# Patient Record
Sex: MALE | Race: WHITE | NOT HISPANIC OR LATINO | Employment: OTHER | URBAN - METROPOLITAN AREA
[De-identification: names, ages, dates, MRNs, and addresses within clinical notes are randomized per-mention and may not be internally consistent; named-entity substitution may affect disease eponyms.]

---

## 2017-10-12 DIAGNOSIS — Z20.828 CONTACT WITH AND (SUSPECTED) EXPOSURE TO OTHER VIRAL COMMUNICABLE DISEASES: ICD-10-CM

## 2017-10-19 ENCOUNTER — LAB CONVERSION - ENCOUNTER (OUTPATIENT)
Dept: OTHER | Facility: OTHER | Age: 32
End: 2017-10-19

## 2017-10-19 LAB
ADDITIONAL INFORMATION (HISTORICAL): NO
ADDITIONAL INFORMATION (HISTORICAL): NO
Lab: NEGATIVE
Lab: NO
Lab: NORMAL
Lab: NOT DETECTED
Lab: YES
Lab: YES

## 2017-12-08 ENCOUNTER — ALLSCRIPTS OFFICE VISIT (OUTPATIENT)
Dept: OTHER | Facility: OTHER | Age: 32
End: 2017-12-08

## 2017-12-08 DIAGNOSIS — D56.9 THALASSEMIA: ICD-10-CM

## 2017-12-18 ENCOUNTER — LAB CONVERSION - ENCOUNTER (OUTPATIENT)
Dept: OTHER | Facility: OTHER | Age: 32
End: 2017-12-18

## 2017-12-18 LAB
BASOPHILS # BLD AUTO: 0.4 %
BASOPHILS # BLD AUTO: 20 CELLS/UL (ref 0–200)
DEPRECATED RDW RBC AUTO: 11.9 % (ref 11–15)
DEPRECATED RDW RBC AUTO: 11.9 % (ref 11–15)
EOSINOPHIL # BLD AUTO: 1 %
EOSINOPHIL # BLD AUTO: 51 CELLS/UL (ref 15–500)
HCT VFR BLD AUTO: 41.1 % (ref 38.5–50)
HCT VFR BLD AUTO: 41.1 % (ref 38.5–50)
HEMOGLOBIN A2 QUANTITATION (HISTORICAL): 2.2 % (ref 1.8–3.5)
HEMOGLOBIN A2 QUANTITATION (HISTORICAL): 96.8 %
HEMOGLOBIN F QUANTITATION (HISTORICAL): <1 %
HGB BLD-MCNC: 13.9 G/DL (ref 13.2–17.1)
HGB BLD-MCNC: 13.9 G/DL (ref 13.2–17.1)
INTERPRETATION (HISTORICAL): NORMAL
LYMPHOCYTES # BLD AUTO: 1882 CELLS/UL (ref 850–3900)
LYMPHOCYTES # BLD AUTO: 36.9 %
MCH RBC QN AUTO: 31.7 PG (ref 27–33)
MCH RBC QN AUTO: 31.7 PG (ref 27–33)
MCHC RBC AUTO-ENTMCNC: 33.8 G/DL (ref 32–36)
MCV RBC AUTO: 93.6 FL (ref 80–100)
MCV RBC AUTO: 93.6 FL (ref 80–100)
MONOCYTES # BLD AUTO: 464 CELLS/UL (ref 200–950)
MONOCYTES (HISTORICAL): 9.1 %
NEUTROPHILS # BLD AUTO: 2683 CELLS/UL (ref 1500–7800)
NEUTROPHILS # BLD AUTO: 52.6 %
PLATELET # BLD AUTO: 185 THOUSAND/UL (ref 140–400)
PMV BLD AUTO: 12 FL (ref 7.5–12.5)
RBC # BLD AUTO: 4.39 MILLION/UL (ref 4.2–5.8)
RBC # BLD AUTO: 4.39 MILLION/UL (ref 4.2–5.8)
WBC # BLD AUTO: 5.1 THOUSAND/UL (ref 3.8–10.8)

## 2017-12-29 ENCOUNTER — GENERIC CONVERSION - ENCOUNTER (OUTPATIENT)
Dept: OTHER | Facility: OTHER | Age: 32
End: 2017-12-29

## 2018-01-23 VITALS
DIASTOLIC BLOOD PRESSURE: 68 MMHG | OXYGEN SATURATION: 98 % | TEMPERATURE: 97.7 F | HEART RATE: 68 BPM | HEIGHT: 68 IN | SYSTOLIC BLOOD PRESSURE: 122 MMHG | WEIGHT: 177 LBS | BODY MASS INDEX: 26.83 KG/M2
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
## 2018-12-12 ENCOUNTER — OFFICE VISIT (OUTPATIENT)
Dept: FAMILY MEDICINE CLINIC | Facility: CLINIC | Age: 33
End: 2018-12-12
Payer: COMMERCIAL

## 2018-12-12 VITALS
HEART RATE: 87 BPM | TEMPERATURE: 98.3 F | WEIGHT: 173.2 LBS | HEIGHT: 68 IN | BODY MASS INDEX: 26.25 KG/M2 | OXYGEN SATURATION: 98 % | SYSTOLIC BLOOD PRESSURE: 124 MMHG | DIASTOLIC BLOOD PRESSURE: 62 MMHG

## 2018-12-12 DIAGNOSIS — M99.08 SEGMENTAL AND SOMATIC DYSFUNCTION OF RIB CAGE: ICD-10-CM

## 2018-12-12 DIAGNOSIS — S29.012A STRAIN OF MUSCLE AND TENDON OF BACK WALL OF THORAX, INITIAL ENCOUNTER: ICD-10-CM

## 2018-12-12 DIAGNOSIS — M99.01 CERVICAL SOMATIC DYSFUNCTION: ICD-10-CM

## 2018-12-12 DIAGNOSIS — M99.04 SACRAL REGION SOMATIC DYSFUNCTION: ICD-10-CM

## 2018-12-12 DIAGNOSIS — G89.29 CHRONIC RIGHT-SIDED LOW BACK PAIN WITHOUT SCIATICA: ICD-10-CM

## 2018-12-12 DIAGNOSIS — M99.05 PELVIC SOMATIC DYSFUNCTION: ICD-10-CM

## 2018-12-12 DIAGNOSIS — M54.2 NECK PAIN ON RIGHT SIDE: ICD-10-CM

## 2018-12-12 DIAGNOSIS — S16.1XXA CERVICAL STRAIN, INITIAL ENCOUNTER: ICD-10-CM

## 2018-12-12 DIAGNOSIS — R07.81 RIB PAIN ON LEFT SIDE: ICD-10-CM

## 2018-12-12 DIAGNOSIS — M99.02 SOMATIC DYSFUNCTION OF THORACIC REGION: ICD-10-CM

## 2018-12-12 DIAGNOSIS — S23.41XA RIB SPRAIN, INITIAL ENCOUNTER: ICD-10-CM

## 2018-12-12 DIAGNOSIS — M54.50 CHRONIC RIGHT-SIDED LOW BACK PAIN WITHOUT SCIATICA: ICD-10-CM

## 2018-12-12 DIAGNOSIS — Z23 NEED FOR INFLUENZA VACCINATION: Primary | ICD-10-CM

## 2018-12-12 DIAGNOSIS — S39.013A STRAIN OF MUSCLE, FASCIA AND TENDON OF PELVIS, INITIAL ENCOUNTER: ICD-10-CM

## 2018-12-12 DIAGNOSIS — S39.012A STRAIN, SACRAL, INITIAL ENCOUNTER: ICD-10-CM

## 2018-12-12 PROCEDURE — 98927 OSTEOPATH MANJ 5-6 REGIONS: CPT | Performed by: FAMILY MEDICINE

## 2018-12-12 PROCEDURE — 90686 IIV4 VACC NO PRSV 0.5 ML IM: CPT

## 2018-12-12 PROCEDURE — 90471 IMMUNIZATION ADMIN: CPT

## 2018-12-12 PROCEDURE — 99214 OFFICE O/P EST MOD 30 MIN: CPT | Performed by: FAMILY MEDICINE
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
## 2018-12-13 NOTE — PROGRESS NOTES
OMT  Performed by: Faisal Brown  Authorized by: Faisal Brown     Verbal consent obtained?: Yes    Risks and benefits: Risks, benefits and alternatives were discussed    Consent given by:  Patient  Patient states understanding of procedure being performed: Yes    Patient's understanding of procedure matches consent: Yes    Procedure Details:     Region evaluated and treated:  Cervical, Thoracic T1 - T4, Ribs, Sacrum/Pelvis and Pelvis Innominate    Cervical Details:     Examination Method:  Tenderness, Pain and Asymmetry, Misalignment, Crepitation, Defects, Masses    Severity:  Mild    Treatment Method:  High Velocity, Low Amplitude Treatment    Response:  Resolved - The somatic dysfunction is completely resolved without evidence of it ever having been present  Thoracic T1 - T4 details:     Examination Method:  Asymmetry, Misalignment, Crepitation, Defects, Masses and Tenderness, Pain    Treatment Method:  High Velocity, Low Amplitude Treatment    Response:  Resolved    Sacrum/Pelvis details:     Examination Method:  Asymmetry, Misalignment, Crepitation, Defects, Masses and Tenderness, Pain    Severity:  Mild    Treatment Method:  High Velocity, Low Amplitude Treatment    Response:  Resolved - The somatic dysfunction is completely resolved without evidence of it ever having been present  Pelvis Innominate details:     Examination Method:  Asymmetry, Misalignment, Crepitation, Defects, Masses    Treatment Method:  High Velocity, Low Amplitude Treatment    Response:  Resolved - The somatic dysfunction is completely resolved without evidence of it ever having been present  Ribs details:     Examination Method:  Tenderness, Pain and Asymmetry, Misalignment, Crepitation, Defects, Masses    Severity:  Mild    Treatment Method:  High Velocity, Low Amplitude Treatment    Response:  Resolved - The somatic dysfunction is completely resolved without evidence of it ever having been present      Total Regions Treated: 5

## 2018-12-13 NOTE — PATIENT INSTRUCTIONS
Unfold above  Recheck, level upp thorax and pelvis  Problems should be gone  Any problem call  No heat to areas

## 2020-10-27 ENCOUNTER — OFFICE VISIT (OUTPATIENT)
Dept: FAMILY MEDICINE CLINIC | Facility: CLINIC | Age: 35
End: 2020-10-27
Payer: COMMERCIAL

## 2020-10-27 VITALS
BODY MASS INDEX: 25.1 KG/M2 | WEIGHT: 165.6 LBS | DIASTOLIC BLOOD PRESSURE: 62 MMHG | OXYGEN SATURATION: 97 % | HEIGHT: 68 IN | TEMPERATURE: 96.5 F | HEART RATE: 77 BPM | SYSTOLIC BLOOD PRESSURE: 108 MMHG

## 2020-10-27 DIAGNOSIS — M99.04 SACRAL REGION SOMATIC DYSFUNCTION: ICD-10-CM

## 2020-10-27 DIAGNOSIS — M99.05 PELVIC SOMATIC DYSFUNCTION: ICD-10-CM

## 2020-10-27 DIAGNOSIS — S39.012A STRAIN, SACRAL, INITIAL ENCOUNTER: ICD-10-CM

## 2020-10-27 DIAGNOSIS — L71.9 ROSACEA: Primary | ICD-10-CM

## 2020-10-27 DIAGNOSIS — G89.29 CHRONIC BILATERAL LOW BACK PAIN WITHOUT SCIATICA: ICD-10-CM

## 2020-10-27 DIAGNOSIS — M54.50 CHRONIC BILATERAL LOW BACK PAIN WITHOUT SCIATICA: ICD-10-CM

## 2020-10-27 DIAGNOSIS — S39.012A LUMBAR STRAIN, INITIAL ENCOUNTER: ICD-10-CM

## 2020-10-27 DIAGNOSIS — M99.03 SEGMENTAL AND SOMATIC DYSFUNCTION OF LUMBAR REGION: ICD-10-CM

## 2020-10-27 DIAGNOSIS — S39.013A STRAIN OF MUSCLE, FASCIA AND TENDON OF PELVIS, INITIAL ENCOUNTER: ICD-10-CM

## 2020-10-27 DIAGNOSIS — L85.3 DRY SKIN DERMATITIS: ICD-10-CM

## 2020-10-27 PROCEDURE — 99214 OFFICE O/P EST MOD 30 MIN: CPT | Performed by: FAMILY MEDICINE

## 2020-10-27 PROCEDURE — 98926 OSTEOPATH MANJ 3-4 REGIONS: CPT | Performed by: FAMILY MEDICINE

## 2020-10-27 PROCEDURE — 3008F BODY MASS INDEX DOCD: CPT | Performed by: FAMILY MEDICINE

## 2021-05-17 ENCOUNTER — OFFICE VISIT (OUTPATIENT)
Dept: FAMILY MEDICINE CLINIC | Facility: CLINIC | Age: 36
End: 2021-05-17
Payer: COMMERCIAL

## 2021-05-17 VITALS
TEMPERATURE: 97.2 F | HEIGHT: 68 IN | SYSTOLIC BLOOD PRESSURE: 118 MMHG | RESPIRATION RATE: 19 BRPM | HEART RATE: 76 BPM | WEIGHT: 165.6 LBS | BODY MASS INDEX: 25.1 KG/M2 | DIASTOLIC BLOOD PRESSURE: 80 MMHG | OXYGEN SATURATION: 99 %

## 2021-05-17 DIAGNOSIS — M99.01 CERVICAL SOMATIC DYSFUNCTION: ICD-10-CM

## 2021-05-17 DIAGNOSIS — M54.9 ACUTE UPPER BACK PAIN: ICD-10-CM

## 2021-05-17 DIAGNOSIS — M99.08 SEGMENTAL AND SOMATIC DYSFUNCTION OF RIB CAGE: ICD-10-CM

## 2021-05-17 DIAGNOSIS — S23.41XA RIB SPRAIN, INITIAL ENCOUNTER: ICD-10-CM

## 2021-05-17 DIAGNOSIS — M99.02 SOMATIC DYSFUNCTION OF THORACIC REGION: ICD-10-CM

## 2021-05-17 DIAGNOSIS — S16.1XXA CERVICAL STRAIN, INITIAL ENCOUNTER: ICD-10-CM

## 2021-05-17 DIAGNOSIS — S29.012A STRAIN OF MUSCLE AND TENDON OF BACK WALL OF THORAX, INITIAL ENCOUNTER: ICD-10-CM

## 2021-05-17 DIAGNOSIS — M54.2 NECK PAIN: Primary | ICD-10-CM

## 2021-05-17 PROCEDURE — 3725F SCREEN DEPRESSION PERFORMED: CPT | Performed by: FAMILY MEDICINE

## 2021-05-17 PROCEDURE — 98926 OSTEOPATH MANJ 3-4 REGIONS: CPT | Performed by: FAMILY MEDICINE

## 2021-05-17 PROCEDURE — 3008F BODY MASS INDEX DOCD: CPT | Performed by: FAMILY MEDICINE

## 2021-05-17 PROCEDURE — 99214 OFFICE O/P EST MOD 30 MIN: CPT | Performed by: FAMILY MEDICINE

## 2021-05-17 PROCEDURE — 1036F TOBACCO NON-USER: CPT | Performed by: FAMILY MEDICINE

## 2021-05-17 NOTE — PROGRESS NOTES
OMT  Performed by: Harjinder Mobley DO  Authorized by: Harjinder Mobley, DO     Verbal consent obtained?: Yes    Risks and benefits: Risks, benefits and alternatives were discussed    Consent given by:  Patient  Procedure Details:     Region evaluated and treated:  Cervical, Thoracic T1 - T4 and Ribs    Cervical Details:     Examination Method:  Asymmetry, Misalignment, Crepitation, Defects, Masses, Tenderness, Pain and Range of Motion, Contracture    Severity:  Mild    Treatment Method:  High Velocity, Low Amplitude Treatment, Soft Tissue Treatment, Muscle Energy Treatment and Myofascial Release Treatment    Response:  Improved - The somatic dysfunction is improved but not completely resolved  Thoracic T1 - T4 details:     Examination Method:  Asymmetry, Misalignment, Crepitation, Defects, Masses and Tenderness, Pain    Severity:  Mild    Treatment Method:  High Velocity, Low Amplitude Treatment, Muscle Energy Treatment, Myofascial Release Treatment and Soft Tissue Treatment    Response:  Improved    Ribs details:     Examination Method:  Asymmetry, Misalignment, Crepitation, Defects, Masses and Tenderness, Pain    Severity:  Mild    Treatment Method:  High Velocity, Low Amplitude Treatment, Muscle Energy Treatment and Soft Tissue Treatment    Response:  Resolved - The somatic dysfunction is completely resolved without evidence of it ever having been present      Total Regions Treated:  3
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
## 2021-10-15 ENCOUNTER — OFFICE VISIT (OUTPATIENT)
Dept: FAMILY MEDICINE CLINIC | Facility: CLINIC | Age: 36
End: 2021-10-15
Payer: COMMERCIAL

## 2021-10-15 VITALS
TEMPERATURE: 97.1 F | HEIGHT: 68 IN | WEIGHT: 160.4 LBS | HEART RATE: 84 BPM | DIASTOLIC BLOOD PRESSURE: 68 MMHG | BODY MASS INDEX: 24.31 KG/M2 | OXYGEN SATURATION: 99 % | SYSTOLIC BLOOD PRESSURE: 112 MMHG

## 2021-10-15 DIAGNOSIS — M99.04 SACRAL REGION SOMATIC DYSFUNCTION: ICD-10-CM

## 2021-10-15 DIAGNOSIS — S39.013A STRAIN OF MUSCLE, FASCIA AND TENDON OF PELVIS, INITIAL ENCOUNTER: ICD-10-CM

## 2021-10-15 DIAGNOSIS — M99.03 SEGMENTAL AND SOMATIC DYSFUNCTION OF LUMBAR REGION: ICD-10-CM

## 2021-10-15 DIAGNOSIS — S39.012A STRAIN, SACRAL, INITIAL ENCOUNTER: ICD-10-CM

## 2021-10-15 DIAGNOSIS — M54.50 CHRONIC BILATERAL LOW BACK PAIN WITHOUT SCIATICA: Primary | ICD-10-CM

## 2021-10-15 DIAGNOSIS — M21.70 UNEQUAL LEG LENGTH: ICD-10-CM

## 2021-10-15 DIAGNOSIS — M21.072 ACQUIRED EVERSION OF LEFT FOOT: ICD-10-CM

## 2021-10-15 DIAGNOSIS — G89.29 CHRONIC BILATERAL LOW BACK PAIN WITHOUT SCIATICA: Primary | ICD-10-CM

## 2021-10-15 DIAGNOSIS — M99.05 PELVIC SOMATIC DYSFUNCTION: ICD-10-CM

## 2021-10-15 DIAGNOSIS — S39.012A LUMBAR STRAIN, INITIAL ENCOUNTER: ICD-10-CM

## 2021-10-15 PROCEDURE — 99214 OFFICE O/P EST MOD 30 MIN: CPT | Performed by: FAMILY MEDICINE

## 2021-10-15 PROCEDURE — 3008F BODY MASS INDEX DOCD: CPT | Performed by: FAMILY MEDICINE

## 2021-10-15 PROCEDURE — 98926 OSTEOPATH MANJ 3-4 REGIONS: CPT | Performed by: FAMILY MEDICINE

## 2021-10-15 PROCEDURE — 1036F TOBACCO NON-USER: CPT | Performed by: FAMILY MEDICINE

## 2021-10-15 PROCEDURE — 3725F SCREEN DEPRESSION PERFORMED: CPT | Performed by: FAMILY MEDICINE

## 2021-10-22 ENCOUNTER — HOSPITAL ENCOUNTER (OUTPATIENT)
Dept: RADIOLOGY | Age: 36
Discharge: HOME | End: 2021-10-22
Payer: COMMERCIAL

## 2021-10-22 DIAGNOSIS — G89.29 CHRONIC BILATERAL LOW BACK PAIN WITHOUT SCIATICA: ICD-10-CM

## 2021-10-22 DIAGNOSIS — M54.50 CHRONIC BILATERAL LOW BACK PAIN WITHOUT SCIATICA: ICD-10-CM

## 2021-10-22 DIAGNOSIS — M21.70 UNEQUAL LEG LENGTH: ICD-10-CM

## 2021-10-22 PROCEDURE — 77073 BONE LENGTH STUDIES: CPT

## 2022-05-16 ENCOUNTER — OFFICE VISIT (OUTPATIENT)
Dept: FAMILY MEDICINE CLINIC | Facility: CLINIC | Age: 37
End: 2022-05-16
Payer: COMMERCIAL

## 2022-05-16 VITALS
HEIGHT: 68 IN | TEMPERATURE: 97.6 F | SYSTOLIC BLOOD PRESSURE: 118 MMHG | DIASTOLIC BLOOD PRESSURE: 80 MMHG | HEART RATE: 82 BPM | OXYGEN SATURATION: 98 % | BODY MASS INDEX: 24.71 KG/M2 | WEIGHT: 163 LBS

## 2022-05-16 DIAGNOSIS — N50.811 PAIN IN RIGHT TESTICLE: ICD-10-CM

## 2022-05-16 DIAGNOSIS — Z00.00 HEALTH CARE MAINTENANCE: Primary | ICD-10-CM

## 2022-05-16 PROCEDURE — 3008F BODY MASS INDEX DOCD: CPT | Performed by: FAMILY MEDICINE

## 2022-05-16 PROCEDURE — 99395 PREV VISIT EST AGE 18-39: CPT | Performed by: FAMILY MEDICINE

## 2022-05-16 PROCEDURE — 1036F TOBACCO NON-USER: CPT | Performed by: FAMILY MEDICINE

## 2022-05-16 PROCEDURE — 3725F SCREEN DEPRESSION PERFORMED: CPT | Performed by: FAMILY MEDICINE

## 2022-05-16 RX ORDER — DOXYCYCLINE 100 MG/1
100 TABLET ORAL 2 TIMES DAILY
COMMUNITY
Start: 2022-05-09 | End: 2022-05-19

## 2022-05-16 RX ORDER — DOXYCYCLINE 100 MG/1
100 TABLET ORAL 2 TIMES DAILY
COMMUNITY
Start: 2022-05-09

## 2022-05-16 NOTE — PROGRESS NOTES
Chief Complaint   Patient presents with    Testicle Pain     Assessment/Plan:  Discussed protein intake  BMI Counseling: Body mass index is 24 78 kg/m²  The BMI in normal range  PHQ-2/9 Depression Screening    Little interest or pleasure in doing things: 0 - not at all  Feeling down, depressed, or hopeless: 0 - not at all  PHQ-2 Score: 0  PHQ-2 Interpretation: Negative depression screen          Diagnoses and all orders for this visit:    Health care maintenance    Pain in right testicle    Other orders  -     doxycycline (ADOXA) 100 MG tablet; Take 100 mg by mouth in the morning and 100 mg before bedtime   -     doxycycline (ADOXA) 100 MG tablet; Take 100 mg by mouth in the morning and 100 mg in the evening  Subjective:      Patient ID: Cynthia Handy is a 29 y o  male  Physical   Right low back and right testicle pain  Saw Urology - treated with Doxycycline - testicle pain gone  FH: mother healthy, Father: Alcoholic, Type 2 DM, poor dentition  SH: neg tob, occasional OH  The following portions of the patient's history were reviewed and updated as appropriate: allergies, current medications, past family history, past medical history, past social history, past surgical history and problem list     Review of Systems   Constitutional: Negative  HENT: Negative  Eyes: Negative  Respiratory: Negative  Cardiovascular: Negative  Gastrointestinal: Negative  Genitourinary: Negative  Musculoskeletal: Negative  Skin: Negative  Neurological: Negative  Psychiatric/Behavioral: Negative  Objective:      /80 (BP Location: Left arm, Patient Position: Sitting, Cuff Size: Large)   Pulse 82   Temp 97 6 °F (36 4 °C) (Temporal)   Ht 5' 8" (1 727 m)   Wt 73 9 kg (163 lb)   SpO2 98%   BMI 24 78 kg/m²       Current Outpatient Medications:     doxycycline (ADOXA) 100 MG tablet, Take 100 mg by mouth in the morning and 100 mg before bedtime  , Disp: , Rfl:    doxycycline (ADOXA) 100 MG tablet, Take 100 mg by mouth in the morning and 100 mg in the evening , Disp: , Rfl:   No Known Allergies  History reviewed  No pertinent surgical history  Physical Exam  Constitutional:       Appearance: He is well-developed  HENT:      Head: Normocephalic and atraumatic  Right Ear: Tympanic membrane, ear canal and external ear normal       Left Ear: Tympanic membrane, ear canal and external ear normal       Nose: Nose normal       Mouth/Throat:      Mouth: Mucous membranes are moist    Eyes:      Conjunctiva/sclera: Conjunctivae normal       Pupils: Pupils are equal, round, and reactive to light  Cardiovascular:      Rate and Rhythm: Normal rate and regular rhythm  Heart sounds: Normal heart sounds  Pulmonary:      Effort: Pulmonary effort is normal       Breath sounds: Normal breath sounds  Abdominal:      General: Abdomen is flat  Bowel sounds are normal       Palpations: Abdomen is soft  Musculoskeletal:      Cervical back: Normal range of motion and neck supple  Skin:     General: Skin is warm and dry  Neurological:      General: No focal deficit present  Mental Status: He is alert and oriented to person, place, and time  Deep Tendon Reflexes: Reflexes are normal and symmetric  Psychiatric:         Mood and Affect: Mood normal          Behavior: Behavior normal          Thought Content:  Thought content normal          Judgment: Judgment normal

## 2023-07-11 ENCOUNTER — TELEPHONE (OUTPATIENT)
Dept: FAMILY MEDICINE CLINIC | Facility: CLINIC | Age: 38
End: 2023-07-11

## 2024-04-24 ENCOUNTER — TELEPHONE (OUTPATIENT)
Dept: FAMILY MEDICINE CLINIC | Facility: CLINIC | Age: 39
End: 2024-04-24

## 2024-04-24 NOTE — TELEPHONE ENCOUNTER
Patient has been contacted and reminded he is due for the annual physical exam. Per patient he will communicate with his wife to ask which schedule work best for an appointment.

## 2024-04-29 NOTE — TELEPHONE ENCOUNTER
Contact patient. Patient state he will not scheduling appointment with us at this moment because he have lot going on. Insurance Attribution letter created and mailed to patient today 04/29/24.

## 2024-05-06 NOTE — TELEPHONE ENCOUNTER
05/05/24 10:42 PM    Hello    The office’s request has been received. This workflow is for patients that have  not been seen within last 3 years, 3 phone calls and a certified letter (must be letter listed in workflow) are to be sent. Once that workflow is completed, please resend PCP removal request with date letter was sent. PCP will be removed 30 days after certified letter sent (if patient doesn’t respond/scheduled with office).    If patient has been seen within 3 years, we are not able to remove PCP unless patient has transferred care or moved out of the area.      Thank you  Jomar Rajan

## 2024-12-11 ENCOUNTER — TELEPHONE (OUTPATIENT)
Age: 39
End: 2024-12-11

## 2024-12-17 ENCOUNTER — OFFICE VISIT (OUTPATIENT)
Dept: FAMILY MEDICINE CLINIC | Facility: CLINIC | Age: 39
End: 2024-12-17
Payer: COMMERCIAL

## 2024-12-17 VITALS
RESPIRATION RATE: 18 BRPM | HEART RATE: 80 BPM | TEMPERATURE: 98.7 F | HEIGHT: 68 IN | OXYGEN SATURATION: 99 % | WEIGHT: 156.6 LBS | DIASTOLIC BLOOD PRESSURE: 74 MMHG | BODY MASS INDEX: 23.73 KG/M2 | SYSTOLIC BLOOD PRESSURE: 122 MMHG

## 2024-12-17 DIAGNOSIS — R21 RASH: ICD-10-CM

## 2024-12-17 DIAGNOSIS — Z11.59 NEED FOR HEPATITIS C SCREENING TEST: ICD-10-CM

## 2024-12-17 DIAGNOSIS — Z00.00 ANNUAL PHYSICAL EXAM: Primary | ICD-10-CM

## 2024-12-17 DIAGNOSIS — Z11.4 ENCOUNTER FOR SCREENING FOR HIV: ICD-10-CM

## 2024-12-17 DIAGNOSIS — L71.9 ROSACEA: ICD-10-CM

## 2024-12-17 PROCEDURE — 99395 PREV VISIT EST AGE 18-39: CPT | Performed by: FAMILY MEDICINE

## 2024-12-17 RX ORDER — ASCORBIC ACID 500 MG
500 TABLET ORAL DAILY
COMMUNITY

## 2024-12-17 RX ORDER — GINSENG 100 MG
1 CAPSULE ORAL DAILY
COMMUNITY

## 2024-12-17 NOTE — PROGRESS NOTES
Adult Annual Physical  Name: Torito Sullivan      : 1985      MRN: [id number]  Encounter Provider: Javi Acuna DO  Encounter Date: 2024   Encounter department: [facility]    Assessment & Plan  Annual physical exam         Rash         Need for hepatitis C screening test    Orders:    Hepatitis C antibody; Future    Encounter for screening for HIV    Orders:    HIV 1/2 AG/AB w Reflex [facility] for 2 yr old and above; Future    Rosacea    Orders:    Ambulatory Referral to Dermatology; Future    Immunizations and preventive care screenings were discussed with patient today. Appropriate education was printed on patient's after visit summary.  Chief Complaint   Patient presents with    Annual Exam    Rash     Face, forehead, area is red, irritating and itchy, going on for years.applying on and off vitamin E      Counseling:  Alcohol/drug use: discussed moderation in alcohol intake, the recommendations for healthy alcohol use, and avoidance of illicit drug use.  Dental Health: discussed importance of regular tooth brushing, flossing, and dental visits.  Injury prevention: discussed safety/seat belts, safety helmets, smoke detectors, carbon monoxide detectors, and smoking near bedding or upholstery.  Sexual health: discussed sexually transmitted diseases, partner selection, use of condoms, avoidance of unintended pregnancy, and contraceptive alternatives.  Exercise: the importance of regular exercise/physical activity was discussed. Recommend exercise 3-5 times per week for at least 30 minutes.       Depression Screening and Follow-up Plan: Patient was screened for depression during today's encounter. They screened negative with a PHQ-2 score of 0.        History of Present Illness     Adult Annual Physical:  Patient presents for annual physical.     Diet and Physical Activity:  - Diet/Nutrition: well balanced diet.  - Exercise: 3-4 times a week on average and strength training  "exercises.    Depression Screening:  - PHQ-2 Score: 0    General Health:  - Sleep: 7-8 hours of sleep on average.  - Hearing: normal hearing right ear.  - Vision: no vision problems.  - Dental: regular dental visits.     Health:  - History of STDs: no.   - Urinary symptoms: none.     Advanced Care Planning:  - Has an advanced directive?: no    - Has a durable medical POA?: yes    - ACP document given to patient?: no      Review of Systems   Constitutional: Negative.    HENT: Negative.     Eyes: Negative.    Respiratory: Negative.     Cardiovascular: Negative.    Gastrointestinal: Negative.    Genitourinary: Negative.    Musculoskeletal: Negative.    Skin: Negative.    Neurological: Negative.    Psychiatric/Behavioral: Negative.           Objective   /74 (BP Location: Left arm, Patient Position: Sitting, Cuff Size: Standard)   Pulse 80   Temp 98.7 °F (37.1 °C) (Temporal)   Resp 18   Ht 5' 8\" (1.727 m)   Wt 71 kg (156 lb 9.6 oz)   SpO2 99%   BMI 23.81 kg/m²     Physical Exam  Constitutional:       Appearance: He is well-developed.   HENT:      Head: Normocephalic and atraumatic.      Right Ear: External ear normal.      Left Ear: External ear normal.      Nose: Nose normal.   Eyes:      Conjunctiva/sclera: Conjunctivae normal.      Pupils: Pupils are equal, round, and reactive to light.   Cardiovascular:      Rate and Rhythm: Normal rate and regular rhythm.      Heart sounds: Normal heart sounds.   Pulmonary:      Effort: Pulmonary effort is normal.      Breath sounds: Normal breath sounds.   Abdominal:      General: Bowel sounds are normal.      Palpations: Abdomen is soft.   Musculoskeletal:      Cervical back: Normal range of motion and neck supple.   Skin:     General: Skin is warm and dry.   Neurological:      Mental Status: He is alert and oriented to person, place, and time.      Deep Tendon Reflexes: Reflexes are normal and symmetric.   Psychiatric:         Behavior: Behavior normal.         " Thought Content: Thought content normal.         Judgment: Judgment normal.